# Patient Record
Sex: FEMALE
[De-identification: names, ages, dates, MRNs, and addresses within clinical notes are randomized per-mention and may not be internally consistent; named-entity substitution may affect disease eponyms.]

---

## 2024-03-29 ENCOUNTER — APPOINTMENT (OUTPATIENT)
Dept: OBGYN | Facility: CLINIC | Age: 39
End: 2024-03-29
Payer: COMMERCIAL

## 2024-03-29 VITALS
DIASTOLIC BLOOD PRESSURE: 68 MMHG | HEIGHT: 67 IN | WEIGHT: 163 LBS | SYSTOLIC BLOOD PRESSURE: 96 MMHG | HEART RATE: 83 BPM | BODY MASS INDEX: 25.58 KG/M2 | OXYGEN SATURATION: 98 %

## 2024-03-29 DIAGNOSIS — Z12.39 ENCOUNTER FOR OTHER SCREENING FOR MALIGNANT NEOPLASM OF BREAST: ICD-10-CM

## 2024-03-29 DIAGNOSIS — Z01.419 ENCOUNTER FOR GYNECOLOGICAL EXAMINATION (GENERAL) (ROUTINE) W/OUT ABNORMAL FINDINGS: ICD-10-CM

## 2024-03-29 PROBLEM — Z00.00 ENCOUNTER FOR PREVENTIVE HEALTH EXAMINATION: Status: ACTIVE | Noted: 2024-03-29

## 2024-03-29 PROCEDURE — 99386 PREV VISIT NEW AGE 40-64: CPT

## 2024-03-29 NOTE — END OF VISIT
[FreeTextEntry3] : I, Velia Dye, acted solely as a scribe for Dr. Jazmine Encarnacion MD., on 03/29/2024.  All medical record entries made by the scribe were at my, Dr. Jazmine Encarnacion MD., direction and personally dictated by me on 03/29/2024. I have personally reviewed the chart and agree that the record accurately reflects my personal performance of the history, physical exam, assessment and plan.

## 2024-03-29 NOTE — HISTORY OF PRESENT ILLNESS
[FreeTextEntry1] : MARISA DIAMOND is a G0 38 year old presenting for new patient annual. Pt reports LGSIL last pap from previous GYN, was following up every 6 mo. Pt reports monthly regular periods, h/o of PCOS and used to be on OCP, periods normalized after her 30s. Denies any pain. has never had a mammogram states never had hpv vaccine  Pobhx: nullip Pgynhx: hx of abnormal paps, denies STI, cysts, fibroids PMH: none PSH: none FH: denies gyn cancers, breast cancer, VTE SH: none [facility]

## 2024-03-29 NOTE — PLAN
[FreeTextEntry1] : MARISA DIAMOND is a 38 year old presenting for new patient annual -Pap/HPV done today  -Rx for mammo/sono  -to see pcp yearly  RTO 1 yr for annual  Jazmine Encarnacion MD

## 2024-04-02 LAB — HPV HIGH+LOW RISK DNA PNL CVX: NOT DETECTED

## 2024-04-03 LAB — CYTOLOGY CVX/VAG DOC THIN PREP: NORMAL
